# Patient Record
Sex: MALE | Race: WHITE | NOT HISPANIC OR LATINO | Employment: FULL TIME | URBAN - METROPOLITAN AREA
[De-identification: names, ages, dates, MRNs, and addresses within clinical notes are randomized per-mention and may not be internally consistent; named-entity substitution may affect disease eponyms.]

---

## 2024-01-31 ENCOUNTER — LAB (OUTPATIENT)
Dept: LAB | Facility: HOSPITAL | Age: 59
End: 2024-01-31
Payer: MEDICAID

## 2024-01-31 ENCOUNTER — TRANSCRIBE ORDERS (OUTPATIENT)
Dept: ADMINISTRATIVE | Facility: HOSPITAL | Age: 59
End: 2024-01-31
Payer: MEDICAID

## 2024-01-31 DIAGNOSIS — Z01.812 PRE-OPERATIVE LABORATORY EXAMINATION: ICD-10-CM

## 2024-01-31 DIAGNOSIS — R79.89 OTHER SPECIFIED ABNORMAL FINDINGS OF BLOOD CHEMISTRY: ICD-10-CM

## 2024-01-31 DIAGNOSIS — R68.89 OTHER GENERAL SYMPTOMS AND SIGNS: ICD-10-CM

## 2024-01-31 DIAGNOSIS — Z01.812 PRE-OPERATIVE LABORATORY EXAMINATION: Primary | ICD-10-CM

## 2024-01-31 LAB
ANION GAP SERPL CALCULATED.3IONS-SCNC: 9 MMOL/L (ref 5–15)
BASOPHILS # BLD AUTO: 0.04 10*3/MM3 (ref 0–0.2)
BASOPHILS NFR BLD AUTO: 0.6 % (ref 0–1.5)
BUN SERPL-MCNC: 13 MG/DL (ref 6–20)
BUN/CREAT SERPL: 11.9 (ref 7–25)
CALCIUM SPEC-SCNC: 9.3 MG/DL (ref 8.6–10.5)
CHLORIDE SERPL-SCNC: 97 MMOL/L (ref 98–107)
CO2 SERPL-SCNC: 27 MMOL/L (ref 22–29)
CREAT SERPL-MCNC: 1.09 MG/DL (ref 0.76–1.27)
DEPRECATED RDW RBC AUTO: 40.3 FL (ref 37–54)
EGFRCR SERPLBLD CKD-EPI 2021: 79.2 ML/MIN/1.73
EOSINOPHIL # BLD AUTO: 0.27 10*3/MM3 (ref 0–0.4)
EOSINOPHIL NFR BLD AUTO: 4.1 % (ref 0.3–6.2)
ERYTHROCYTE [DISTWIDTH] IN BLOOD BY AUTOMATED COUNT: 13 % (ref 12.3–15.4)
GLUCOSE SERPL-MCNC: 93 MG/DL (ref 65–99)
HCT VFR BLD AUTO: 43.7 % (ref 37.5–51)
HGB BLD-MCNC: 14.8 G/DL (ref 13–17.7)
IMM GRANULOCYTES # BLD AUTO: 0.01 10*3/MM3 (ref 0–0.05)
IMM GRANULOCYTES NFR BLD AUTO: 0.2 % (ref 0–0.5)
LYMPHOCYTES # BLD AUTO: 2.29 10*3/MM3 (ref 0.7–3.1)
LYMPHOCYTES NFR BLD AUTO: 34.9 % (ref 19.6–45.3)
MCH RBC QN AUTO: 29.6 PG (ref 26.6–33)
MCHC RBC AUTO-ENTMCNC: 33.9 G/DL (ref 31.5–35.7)
MCV RBC AUTO: 87.4 FL (ref 79–97)
MONOCYTES # BLD AUTO: 0.88 10*3/MM3 (ref 0.1–0.9)
MONOCYTES NFR BLD AUTO: 13.4 % (ref 5–12)
NEUTROPHILS NFR BLD AUTO: 3.08 10*3/MM3 (ref 1.7–7)
NEUTROPHILS NFR BLD AUTO: 46.8 % (ref 42.7–76)
NRBC BLD AUTO-RTO: 0 /100 WBC (ref 0–0.2)
PLATELET # BLD AUTO: 257 10*3/MM3 (ref 140–450)
PMV BLD AUTO: 11.4 FL (ref 6–12)
POTASSIUM SERPL-SCNC: 4 MMOL/L (ref 3.5–5.2)
RBC # BLD AUTO: 5 10*6/MM3 (ref 4.14–5.8)
SODIUM SERPL-SCNC: 133 MMOL/L (ref 136–145)
WBC NRBC COR # BLD AUTO: 6.57 10*3/MM3 (ref 3.4–10.8)

## 2024-01-31 PROCEDURE — 87086 URINE CULTURE/COLONY COUNT: CPT

## 2024-01-31 PROCEDURE — 36415 COLL VENOUS BLD VENIPUNCTURE: CPT

## 2024-01-31 PROCEDURE — 85025 COMPLETE CBC W/AUTO DIFF WBC: CPT

## 2024-01-31 PROCEDURE — 80048 BASIC METABOLIC PNL TOTAL CA: CPT

## 2024-02-01 LAB — BACTERIA SPEC AEROBE CULT: NO GROWTH

## 2024-02-16 ENCOUNTER — HOSPITAL ENCOUNTER (EMERGENCY)
Facility: HOSPITAL | Age: 59
Discharge: HOME | End: 2024-02-16
Attending: EMERGENCY MEDICINE
Payer: MEDICAID

## 2024-02-16 VITALS
DIASTOLIC BLOOD PRESSURE: 76 MMHG | HEART RATE: 80 BPM | TEMPERATURE: 98.4 F | HEIGHT: 71 IN | WEIGHT: 240.3 LBS | OXYGEN SATURATION: 100 % | SYSTOLIC BLOOD PRESSURE: 136 MMHG | RESPIRATION RATE: 20 BRPM | BODY MASS INDEX: 33.64 KG/M2

## 2024-02-16 DIAGNOSIS — T85.838A BLEEDING FROM JACKSON-PRATT DRAIN, INITIAL ENCOUNTER: Primary | ICD-10-CM

## 2024-02-16 PROCEDURE — 99282 EMERGENCY DEPT VISIT SF MDM: CPT

## 2024-02-16 NOTE — DISCHARGE INSTRUCTIONS
Keep area clean and dry.  You may wash it with some soapy water and dry well.  Apply 4 x 4's as directed keep pressure on the area.  Return for worsening symptoms.  Follow-up with your urologist on Monday.

## 2024-02-16 NOTE — ED PROVIDER NOTES
Time: 2:22 PM EST  Date of encounter:  2/16/2024  Independent Historian/Clinical History and Information was obtained by:   Patient    History is limited by: N/A    Chief Complaint: Bleeding from drain site in left pubic area      History of Present Illness:  Patient is a 58 y.o. year old male who presents to the emergency department for evaluation of bleeding from drainage site in the left pubic area.  This patient had a penile implant placed at the [facility] yesterday.  The patient has a Umang-Bansal drain coming from the suprapubic area on the left side he states that he had increased blood around the drain.  He does have some blood coming through the drain into the bulb.  He has had no fever chills cough sore throat vomiting or diarrhea he denies any trauma he denies any abdominal pain or difficulty urinating.    HPI    Patient Care Team  Primary Care Provider: Adama Farooq MD    Past Medical History:     No Known Allergies  Past Medical History:   Diagnosis Date    Depression     Hypertension      Past Surgical History:   Procedure Laterality Date    EXPLORATORY LAPAROTOMY       History reviewed. No pertinent family history.    Home Medications:  Prior to Admission medications    Medication Sig Start Date End Date Taking? Authorizing Provider   albuterol sulfate  (90 Base) MCG/ACT inhaler Inhale 2 puffs Every 4 (Four) Hours As Needed for Shortness of Air. 1/25/24   Michelle Prieto APRN   losartan-hydrochlorothiazide (HYZAAR) 50-12.5 MG per tablet Take 1 tablet by mouth Daily. for blood pressure. 1/17/24   Angelika Moreno MD   omeprazole (priLOSEC) 20 MG capsule take 1 capsule by mouth every day 30 minutes before breakfast 12/19/23   Angelika Moreno MD   QUEtiapine (SEROquel) 50 MG tablet Take 1 tablet by mouth Every Night.    Angelika Moreno MD   traMADol (ULTRAM) 50 MG tablet Take 1 tablet by mouth 2 (Two) Times a Day As Needed.    Angelika Moreno MD  "       Social History:   Social History     Tobacco Use    Smoking status: Never    Smokeless tobacco: Current     Types: Snuff   Vaping Use    Vaping Use: Never used   Substance Use Topics    Alcohol use: Not Currently    Drug use: Never         Review of Systems:  Review of Systems   Constitutional:  Negative for chills and fever.   HENT:  Negative for congestion, ear pain and sore throat.    Eyes:  Negative for pain.   Respiratory:  Negative for cough, chest tightness and shortness of breath.    Cardiovascular:  Negative for chest pain.   Gastrointestinal:  Negative for abdominal pain, diarrhea, nausea and vomiting.   Genitourinary:  Negative for flank pain and hematuria.   Musculoskeletal:  Negative for joint swelling.   Skin:  Negative for pallor.        Bleeding from skin around Umang-Bansal drain   Neurological:  Negative for seizures and headaches.   All other systems reviewed and are negative.       Physical Exam:  /76   Pulse 80   Temp 98.4 °F (36.9 °C) (Oral)   Resp 20   Ht 180.3 cm (71\")   Wt 109 kg (240 lb 4.8 oz)   SpO2 100%   BMI 33.52 kg/m²     Physical Exam  Vitals and nursing note reviewed.   Constitutional:       General: He is not in acute distress.     Appearance: Normal appearance. He is not toxic-appearing.   HENT:      Head: Normocephalic and atraumatic.      Right Ear: External ear normal.      Left Ear: External ear normal.      Nose: Nose normal.      Mouth/Throat:      Mouth: Mucous membranes are moist.   Eyes:      General: No scleral icterus.  Pulmonary:      Effort: Pulmonary effort is normal. No respiratory distress.   Abdominal:      General: Abdomen is flat.      Palpations: Abdomen is soft.      Tenderness: There is no abdominal tenderness.   Musculoskeletal:         General: Normal range of motion.      Cervical back: Normal range of motion and neck supple.   Skin:     General: Skin is warm and dry.      Capillary Refill: Capillary refill takes less than 2 seconds. "      Comments: There is a small amount of blood coming from the cutaneous portion around the drainage site.  There is active blood going through the drain.   Neurological:      General: No focal deficit present.      Mental Status: He is alert and oriented to person, place, and time. Mental status is at baseline.   Psychiatric:         Mood and Affect: Mood normal.         Thought Content: Thought content normal.         Judgment: Judgment normal.                  Procedures:  Procedures      Medical Decision Making:      Comorbidities that affect care:    Recent penile implant    External Notes reviewed:    Previous Clinic Note: Office visit for Peyronie's disease      The following orders were placed and all results were independently analyzed by me:  Orders Placed This Encounter   Procedures    IP General Consult (Use specialty-specific consult if known)       Medications Given in the Emergency Department:  Medications - No data to display     ED Course:         Labs:    Lab Results (last 24 hours)       ** No results found for the last 24 hours. **             Imaging:    No Radiology Exams Resulted Within Past 24 Hours      Differential Diagnosis and Discussion:    Bleeding from skin: Dislodgment of drain, and appropriate drain placement, infection, seroma, hematoma.        [facility]               Patient Care Considerations:    CT ABDOMEN AND PELVIS: I considered ordering a CT scan of the abdomen and pelvis however patient has no abdominal pain or tenderness and he has no signs of seroma or hematoma.      Consultants/Shared Management Plan:    Consultant: I have discussed the case with urologist on-call at [facility] who states the patient can be seen next week in the office and to keep bandage on the wound.    Social Determinants of Health:    Patient is independent, reliable, and has access to care.       Disposition and Care Coordination:    Discharged: The patient is suitable and stable for  discharge with no need for consideration of admission.    I have explained discharge medications and the need for follow up with the patient/caretakers. This was also printed in the discharge instructions. Patient was discharged with the following medications and follow up:      Medication List      No changes were made to your prescriptions during this visit.      [facility] urology    In 3 days  Call for appointment       Final diagnoses:   Bleeding from Umang-Bansal drain, initial encounter        ED Disposition       ED Disposition   Discharge    Condition   Stable    Comment   --               This medical record created using voice recognition software.             Kory Dowell, DO  02/16/24 1602

## 2024-05-17 ENCOUNTER — OFFICE VISIT (OUTPATIENT)
Dept: FAMILY MEDICINE CLINIC | Facility: CLINIC | Age: 59
End: 2024-05-17
Payer: COMMERCIAL

## 2024-05-17 VITALS
SYSTOLIC BLOOD PRESSURE: 133 MMHG | OXYGEN SATURATION: 98 % | TEMPERATURE: 97.6 F | WEIGHT: 246 LBS | DIASTOLIC BLOOD PRESSURE: 88 MMHG | HEIGHT: 71 IN | BODY MASS INDEX: 34.44 KG/M2 | HEART RATE: 72 BPM

## 2024-05-17 DIAGNOSIS — I10 PRIMARY HYPERTENSION: ICD-10-CM

## 2024-05-17 DIAGNOSIS — E78.5 HYPERLIPIDEMIA, UNSPECIFIED HYPERLIPIDEMIA TYPE: ICD-10-CM

## 2024-05-17 DIAGNOSIS — F31.77 BIPOLAR DISORDER, IN PARTIAL REMISSION, MOST RECENT EPISODE MIXED: ICD-10-CM

## 2024-05-17 DIAGNOSIS — G89.4 CHRONIC PAIN SYNDROME: Primary | ICD-10-CM

## 2024-05-17 DIAGNOSIS — Z11.59 NEED FOR HEPATITIS C SCREENING TEST: ICD-10-CM

## 2024-05-17 DIAGNOSIS — Z12.11 COLON CANCER SCREENING: ICD-10-CM

## 2024-05-17 DIAGNOSIS — E66.01 MORBID (SEVERE) OBESITY DUE TO EXCESS CALORIES: ICD-10-CM

## 2024-05-17 PROBLEM — R03.0 ELEVATED BLOOD-PRESSURE READING WITHOUT DIAGNOSIS OF HYPERTENSION: Status: ACTIVE | Noted: 2017-08-31

## 2024-05-17 PROBLEM — F41.0 PANIC ATTACK: Status: ACTIVE | Noted: 2020-04-01

## 2024-05-17 PROBLEM — F15.929 METHAMPHETAMINE INTOXICATION: Status: ACTIVE | Noted: 2023-04-18

## 2024-05-17 PROBLEM — E22.1 HYPERPROLACTINEMIA: Status: ACTIVE | Noted: 2020-02-19

## 2024-05-17 PROBLEM — R51.9 HEADACHE: Status: ACTIVE | Noted: 2020-03-20

## 2024-05-17 PROBLEM — G43.909 MIGRAINE: Status: ACTIVE | Noted: 2020-03-06

## 2024-05-17 PROBLEM — F15.129: Status: ACTIVE | Noted: 2023-05-24

## 2024-05-17 PROBLEM — F31.9 BIPOLAR DISORDER: Status: ACTIVE | Noted: 2021-03-14

## 2024-05-17 PROBLEM — M54.9 CHRONIC BACK PAIN: Status: ACTIVE | Noted: 2017-06-01

## 2024-05-17 PROBLEM — N48.6 INDURATIO PENIS PLASTICA: Status: ACTIVE | Noted: 2024-05-17

## 2024-05-17 PROBLEM — G89.29 CHRONIC BACK PAIN: Status: ACTIVE | Noted: 2017-06-01

## 2024-05-17 PROBLEM — Z91.89 AT RISK FOR DEPRESSION: Status: ACTIVE | Noted: 2024-05-17

## 2024-05-17 PROBLEM — F41.9 ANXIETY DISORDER: Status: ACTIVE | Noted: 2017-08-31

## 2024-05-17 PROCEDURE — 99214 OFFICE O/P EST MOD 30 MIN: CPT | Performed by: STUDENT IN AN ORGANIZED HEALTH CARE EDUCATION/TRAINING PROGRAM

## 2024-05-17 RX ORDER — SEMAGLUTIDE 0.25 MG/.5ML
0.25 INJECTION, SOLUTION SUBCUTANEOUS WEEKLY
Qty: 2 ML | Refills: 1 | Status: SHIPPED | OUTPATIENT
Start: 2024-05-17

## 2024-05-17 RX ORDER — SEMAGLUTIDE 0.25 MG/.5ML
0.25 INJECTION, SOLUTION SUBCUTANEOUS WEEKLY
Qty: 2 ML | Refills: 1 | Status: SHIPPED | OUTPATIENT
Start: 2024-05-17 | End: 2024-05-17

## 2024-05-17 RX ORDER — PREGABALIN 200 MG/1
200 CAPSULE ORAL 3 TIMES DAILY
COMMUNITY

## 2024-05-17 NOTE — PROGRESS NOTES
"Chief Complaint  Establish Care (Pt is also having nerve pain ), Hypertension, Heartburn, and Med Refill    Subjective      Fred Horner is a 58 y.o. male who presents to [facility]     New patient:    Patient bipolar: does not want to follow behavioral.    Chronic pain leg - referral to pain management.     HTN- controlled    Weight management - would like to start wegovy       GI eval for colonoscopy.     Objective   Vital Signs:   Vitals:    05/17/24 1406   BP: 133/88   BP Location: Left arm   Patient Position: Sitting   Cuff Size: Adult   Pulse: 72   Temp: 97.6 °F (36.4 °C)   SpO2: 98%   Weight: 112 kg (246 lb)   Height: 180.3 cm (71\")     Body mass index is 34.31 kg/m².    Wt Readings from Last 3 Encounters:   05/17/24 112 kg (246 lb)   02/16/24 109 kg (240 lb 4.8 oz)   01/25/24 108 kg (237 lb 6.4 oz)     BP Readings from Last 3 Encounters:   05/17/24 133/88   02/16/24 136/76   01/25/24 135/90       Health Maintenance   Topic Date Due    LIPID PANEL  Never done    COLORECTAL CANCER SCREENING  Never done    TDAP/TD VACCINES (1 - Tdap) Never done    HEPATITIS C SCREENING  Never done    ZOSTER VACCINE (1 of 2) 05/17/2024 (Originally 6/13/2016)    COVID-19 Vaccine (3 - 2023-24 season) 08/06/2024 (Originally 9/1/2023)    INFLUENZA VACCINE  08/01/2024    ANNUAL PHYSICAL  05/17/2025    BMI FOLLOWUP  05/17/2025    Pneumococcal Vaccine 0-64  Aged Out       Physical Exam  Vitals reviewed.   HENT:      Head: Normocephalic.      Mouth/Throat:      Mouth: Mucous membranes are moist.   Eyes:      Pupils: Pupils are equal, round, and reactive to light.   Cardiovascular:      Rate and Rhythm: Normal rate.   Abdominal:      General: Abdomen is flat.   Musculoskeletal:         General: Normal range of motion.      Cervical back: Normal range of motion.   Skin:     General: Skin is warm.      Capillary Refill: Capillary refill takes less than 2 seconds.   Neurological:      Mental Status: He is " alert.            Assessment & Plan  Chronic pain syndrome  Pain management   Colon cancer screening  GI evaluation for colonoscopy     Hyperlipidemia, unspecified hyperlipidemia type   Lipid abnormalities are stable    Plan:  Continue same medication/s without change.   and check lipid panel.      Discussed medication dosage, use, side effects, and goals of treatment in detail.    Counseled patient on lifestyle modifications to help control hyperlipidemia.     Patient Treatment Goals:   LDL goal is less than 70    Followup in 6 months.  Bipolar disorder, in partial remission, most recent episode mixed  Psychological condition is stable.  Continue current treatment regimen.  Psychological condition  will be reassessed in 6 months.  Primary hypertension  Hypertension is stable and controlled  Continue current treatment regimen.  Blood pressure will be reassessed in 6 months.  Morbid (severe) obesity due to excess calories  Patient's (Body mass index is 34.31 kg/m².) indicates that they are obese (BMI >30) with health conditions that include hypertension and dyslipidemias . Weight is newly identified. BMI  is above average; BMI management plan is completed. We discussed low calorie, low carb based diet program, portion control, increasing exercise, and joining a fitness center or start home based exercise program.   Need for hepatitis C screening test      Orders Placed This Encounter   Procedures    Comprehensive Metabolic Panel    Lipid Panel    TSH    Hepatitis C antibody    Ambulatory Referral to Pain Management    Ambulatory Referral For Screening Colonoscopy    CBC & Differential     New Medications Ordered This Visit   Medications    Semaglutide-Weight Management (Wegovy) 0.25 MG/0.5ML solution auto-injector     Sig: Inject 0.5 mL under the skin into the appropriate area as directed 1 (One) Time Per Week.     Dispense:  2 mL     Refill:  1                  I spent 35 minutes caring for Fred on this date of  service. This time includes time spent by me in the following activities:preparing for the visit, reviewing tests, obtaining and/or reviewing a separately obtained history, performing a medically appropriate examination and/or evaluation , counseling and educating the patient/family/caregiver, ordering medications, tests, or procedures, referring and communicating with other health care professionals , documenting information in the medical record, independently interpreting results and communicating that information with the patient/family/caregiver, and care coordination  FOLLOW UP  Return in about 6 months (around 11/17/2024).  Patient was given instructions and counseling regarding his condition or for health maintenance advice. Please see specific information pulled into the AVS if appropriate.       Niraj Lopez MD  05/17/24  15:23 EDT    CURRENT & DISCONTINUED MEDICATIONS  Current Outpatient Medications   Medication Instructions    albuterol sulfate  (90 Base) MCG/ACT inhaler 2 puffs, Inhalation, Every 4 Hours PRN    losartan-hydrochlorothiazide (HYZAAR) 50-12.5 MG per tablet 1 tablet, Oral, Daily, for blood pressure.    omeprazole (priLOSEC) 20 MG capsule take 1 capsule by mouth every day 30 minutes before breakfast    pregabalin (LYRICA) 200 mg, Oral, 3 Times Daily    QUEtiapine (SEROQUEL) 50 mg, Oral, Nightly    traMADol (ULTRAM) 50 mg, Oral, 2 Times Daily PRN    Wegovy 0.25 mg, Subcutaneous, Weekly       Medications Discontinued During This Encounter   Medication Reason    Semaglutide-Weight Management (Wegovy) 0.25 MG/0.5ML solution auto-injector

## 2024-05-17 NOTE — ASSESSMENT & PLAN NOTE
Psychological condition is stable.  Continue current treatment regimen.  Psychological condition  will be reassessed in 6 months.

## 2024-05-20 ENCOUNTER — TELEPHONE (OUTPATIENT)
Dept: FAMILY MEDICINE CLINIC | Facility: CLINIC | Age: 59
End: 2024-05-20
Payer: COMMERCIAL

## 2024-05-20 NOTE — TELEPHONE ENCOUNTER
Caller: Fred Horner    Relationship to patient: Self    Best call back number: [phone]    Patient is needing: PATIENT CALLED STATING HIS WEGOVY WOULD BE NEEDING A PRIOR AUTHORIZATION SENT OVER TO INSURANCE.